# Patient Record
Sex: FEMALE | Race: WHITE | NOT HISPANIC OR LATINO | Employment: UNEMPLOYED | URBAN - METROPOLITAN AREA
[De-identification: names, ages, dates, MRNs, and addresses within clinical notes are randomized per-mention and may not be internally consistent; named-entity substitution may affect disease eponyms.]

---

## 2024-01-25 ENCOUNTER — APPOINTMENT (OUTPATIENT)
Dept: OBSTETRICS AND GYNECOLOGY | Facility: CLINIC | Age: 59
End: 2024-01-25
Payer: MEDICARE

## 2024-02-20 ENCOUNTER — APPOINTMENT (OUTPATIENT)
Dept: OBSTETRICS AND GYNECOLOGY | Facility: CLINIC | Age: 59
End: 2024-02-20
Payer: MEDICARE

## 2024-02-20 NOTE — PROGRESS NOTES
ANNUAL SUBJECTIVE    Viridiana Bedoya is a 58 y.o. female who presents for annual exam today. She is postmenopausal. She had an episode of PMB in 2022. EMS 3mm at that time. Since then, she denies further*** PMB.    OBHx: ***  PMHx: ADD, anxiety, depression, HLD, HTN***  SurgHx: endometrial ablation, BTL, bilateral mastectomy with reconstruction, ***  Meds/Allergies: reviewed and UTD  Social: ***  FHx: ***breast or ovarian cancer    Last Pap (12/2022): NIL/HRHPV neg  Last Mammogram: history of breast cancer s/p bilateral mastectomy with reconstruction in 2006    OBJECTIVE  There were no vitals taken for this visit.***    General Appearance   - consistent with stated age, well groomed and cooperative    Integumentary  - skin warm and dry without rash    Head and Neck  - normalocephalic and neck supple    Chest and Lung Exam  - normal breathing effort, no respiratory distress    Breast  - symmetry noted, no mass palpable, no skin change and no nipple discharge.***    Abdomen  - soft, nontender and no hepatomegaly, splenomegaly, or mass    Female Genitourinary  - vulva normal without rash or lesion, normal vaginal rugae, no vaginal discharge, uterus normal size & no palpable masses, no adnexal mass, no adnexal tenderness, no cervical motion tenderness    Peripheral Vascular  - no edema present    ASSESSMENT/PLAN  58 y.o. G***P*** female who presents for annual exam.       Actions performed during this visit include:  - Clinical breast exam  - Clinical pelvic exam  - Pap: UTD, next due in 2027  - Mammogram: not indicated s/p bilateral mastectomy with reconstruction. Breast exam today ***  - Colonoscopy: 2021, poor quality due to inadequate prep, was recommended for repeat in 1 year ***  - STI screening: ***  - Flu and COVID vaccinations recommended  - PCP: Dr. Burns    Please return for your next visit in 1 year.    Debo Goodwin MD